# Patient Record
Sex: FEMALE | Race: WHITE
[De-identification: names, ages, dates, MRNs, and addresses within clinical notes are randomized per-mention and may not be internally consistent; named-entity substitution may affect disease eponyms.]

---

## 2018-02-27 ENCOUNTER — HOSPITAL ENCOUNTER (EMERGENCY)
Dept: HOSPITAL 17 - NEPK | Age: 53
Discharge: HOME | End: 2018-02-27
Payer: COMMERCIAL

## 2018-02-27 VITALS
SYSTOLIC BLOOD PRESSURE: 182 MMHG | RESPIRATION RATE: 20 BRPM | HEART RATE: 104 BPM | DIASTOLIC BLOOD PRESSURE: 110 MMHG | TEMPERATURE: 98.3 F

## 2018-02-27 DIAGNOSIS — F43.10: ICD-10-CM

## 2018-02-27 DIAGNOSIS — J44.9: ICD-10-CM

## 2018-02-27 DIAGNOSIS — M25.462: Primary | ICD-10-CM

## 2018-02-27 DIAGNOSIS — I10: ICD-10-CM

## 2018-02-27 DIAGNOSIS — E11.9: ICD-10-CM

## 2018-02-27 DIAGNOSIS — Z87.19: ICD-10-CM

## 2018-02-27 DIAGNOSIS — J45.909: ICD-10-CM

## 2018-02-27 DIAGNOSIS — E11.40: ICD-10-CM

## 2018-02-27 DIAGNOSIS — M19.90: ICD-10-CM

## 2018-02-27 PROCEDURE — 73564 X-RAY EXAM KNEE 4 OR MORE: CPT

## 2018-02-27 PROCEDURE — 99283 EMERGENCY DEPT VISIT LOW MDM: CPT

## 2018-02-27 PROCEDURE — 96372 THER/PROPH/DIAG INJ SC/IM: CPT

## 2018-02-27 NOTE — PD
HPI


Chief Complaint:  Pain: Acute or Chronic


Time Seen by Provider:  16:11


Travel History


International Travel<30 days:  No


Contact w/Intl Traveler<30days:  No


Traveled to known affect area:  No





History of Present Illness


HPI


52-year-old female presents to the emergency room for evaluation of severe left 

knee pain for the past 2 days.  Patient states she injured her knee about 2 

weeks ago after falling down a flight of stairs.  States the pain was minimal 

at that time.  She has had no new injury since then.  States over the past 2 

days pain is severe especially around the medial aspect.  She has history of 

ACL tear and avulsion fracture in 2016.  She was never able to have it 

repaired.  States she has had intermittent, mild pain since then but nothing as 

severe as yesterday and today.  Patient has history of chronic diabetic 

neuropathy and states paresthesias are no worse than normal.  Reports allergy 

to high doses of Tylenol.  States she cannot take anti-inflammatories because 

it hurts her stomach.





PFSH


Past Medical History


Arthritis:  Yes


Asthma:  Yes


Depression:  Yes (PTSD)


Cardiovascular Problems:  Yes


High Cholesterol:  Yes


COPD:  Yes


Diabetes:  Yes


Diminished Hearing:  Yes


Diverticulitis:  Yes


GERD:  Yes


Headaches:  Yes


Hepatitis:  Yes (HEP C)


Hiatal Hernia:  Yes


Hypertension:  Yes


Respiratory:  Yes


Immunizations Current:  Yes


Migraines:  Yes


Pneumonia:  Yes


Triglycerides - High:  Yes


Ulcer:  Yes


Menopausal:  Yes


Tubal Ligation:  Yes





Past Surgical History


 Section:  Yes (X 5)


Hysterectomy:  Yes


Tonsillectomy:  Yes


Other Surgery:  Yes (THROAT BIOPSY)





Social History


Alcohol Use:  Yes (social)


Tobacco Use:  Yes (1 ppd)


Substance Use:  No





Allergies-Medications


(Allergen,Severity, Reaction):  


Coded Allergies:  


     meloxicam (Verified  Allergy, Severe, 17)


 ALLERGY


 


     penicillin G (Verified  Allergy, Severe, 17)


 ALLERGY


 


     prochlorperazine (Verified  Allergy, Severe, 17)


 ALLERGY


 


     tramadol (Verified  Allergy, Severe, 17)


 ALLERGY


     acetaminophen (Verified  Adverse Reaction, Severe, 17)


 GI


Reported Meds & Prescriptions





Reported Meds & Active Scripts


Active


Premarin Vaginal (Estrogens, Conjugated Vaginal) 0.625 Mg/Gm Cream 1 Gm VAGINAL 

HS


Accu-Chek Fastclix Lancet 1 Mis Mis 1 Ea .ROUTE TIDAC


Novolog Inj (Insulin Aspart) 1,000 Unit/10 Ml Vial 2-12 Units SQ TIDAC


Lantus Inj (Insulin Glargine) 100 Unit/Ml Inj 49 Units SQ BID


Ditropan (Oxybutynin Chloride) 5 Mg Tab 5 Mg PO Q8HR


Pantoprazole (Pantoprazole Sodium) 40 Mg Tab 40 Mg PO DAILY


Zocor (Simvastatin) 20 Mg Tab 20 Mg PO DAILY


Advair Diskus Inh (Fluticasone-Salmeterol Inh) 500-50 Mcg/Blist Aer 1 Puff INH 

BID


     Rinse mouth after use.


Ramipril 10 Mg Cap 10 Mg PO DAILY


Duloxetine DR (Duloxetine HCl) 20 Mg Capdr 20 Mg PO DAILY


Byetta Inj (Exenatide) 10 Mcg/0.04 Ml Pfpen 10 Mcg SQ BID


     Use within 60 minutes before the two main


     meals of the day.


Duoneb (Ipratropium-Albuterol Neb) 0.5-2.5 Mg/3 Ml Neb 1 Nebule INH Q4HR NEB


Gabapentin 600 Mg Tab 600 Mg PO HS


Ondansetron Odt 8 Mg Tab 8 Mg SL Q8H PRN


[syringes]     6X DAILY


Linzess (Linaclotide) 290 Mcg Cap 290 Mcg PO DAILY


[needles]     TIDAC


Butalbital-Acetaminophen-Caffeine -40 Mg Cap 1 Cap PO Q4H PRN


     Do not exceed 6 capsules/day.


[walker]   Units  


[nasal cannula]     CONTINUOUS


[oxygen concentrator]     CONTINUOUS








Review of Systems


Except as stated in HPI:  all other systems reviewed are Neg





Physical Exam


Narrative


GENERAL: Well-nourished, well-developed female no acute distress.  Afebrile.  

Ambulatory.


SKIN: Focused skin assessment warm/dry.


HEAD: Normocephalic.


EYES: No scleral icterus. No injection or drainage. 


NECK: Supple, trachea midline. No JVD or lymphadenopathy.


CARDIOVASCULAR: Regular rate and rhythm without murmurs, gallops, or rubs. 


RESPIRATORY: Breath sounds equal bilaterally. No accessory muscle use.


MUSCULOSKELETAL: No cyanosis.  No edema noted.  Mild increased warmth of the 

left knee.  No erythema or ecchymosis.  Tenderness to palpation especially over 

the medial aspect.  No obvious deformity.  Full range of motion.





Data


Data


Last Documented VS





Vital Signs








  Date Time  Temp Pulse Resp B/P (MAP) Pulse Ox O2 Delivery O2 Flow Rate FiO2


 


18 16:01 98.3 104 20 182/110 (134)    








Orders





 Orders


Knee, Complete (4vws) (18 )


Ketorolac Inj (Toradol Inj) (18 17:15)


Cyclobenzaprine (Flexeril) (18 17:15)








[facility]


Medical Decision Making


Medical Screen Exam Complete:  Yes


Emergency Medical Condition:  Yes


Medical Record Reviewed:  Yes


Differential Diagnosis


Strain, sprain, effusion, contusion, fracture


Narrative Course


52-year-old female with history of left ACL tear 2 years ago presents to the 

emergency room for evaluation of left knee pain.  Pain started 2 weeks ago 

after falling down flight of stairs.  Worsened over the past 2 days.  No 

fevers.  Physical exam is reassuring. No edema noted.  Mild increased warmth of 

the left knee.  No erythema or ecchymosis.  Tenderness to palpation especially 

over the medial aspect.  No obvious deformity.  Full range of motion.  X-ray 

shows small effusion without bony deformity.  Patient likely has traumatic 

effusion.  Could be inflammatory arthritis.  No evidence of septic arthritis.  

She has allergies or intolerance to NSAIDs, Tylenol, tramadol, and cannot take 

steroids because of diabetes.  Patient was given Toradol and Flexeril in the 

emergency room.  Discharged with prescription for Flexeril.  Told to follow-up 

with a pain management physician or primary care physician for stronger 

medication.  Told to return for worsening symptoms.





Diagnosis





 Primary Impression:  


 Effusion, left knee


Referrals:  


Primary Care Physician


Patient Instructions:  General Instructions, Swollen Knee Joint (ED)





***Additional Instructions:  


Rest and drink plenty of fluids.


Take Flexeril as directed, as needed for pain.


Apply ice to the affected area for 20 minutes at a time, as needed for pain and 

swelling.


Follow-up with a primary care physician.


Return to the emergency room for worsening symptoms.


Disposition:  01 DISCHARGE HOME


Condition:  Stable











Becca Zavala 2018 17:20

## 2018-02-27 NOTE — RADRPT
EXAM DATE/TIME:  02/27/2018 16:26 

 

[facility] COMPARISON:     

No previous studies available for comparison.

 

                     

INDICATIONS :     

Left knee pain and unable to bear weight, post falling down stairs.

                     

 

MEDICAL HISTORY :            

hx of partial acl tear    

 

SURGICAL HISTORY :     

None.   

 

ENCOUNTER:     

Initial                                        

 

ACUITY:     

2 weeks      

 

PAIN SCORE:     

10/10

 

LOCATION:     

Left  knee

 

FINDINGS:     

Four views of the left knee demonstrate no fracture or dislocation. A small joint effusion is present
. There is no significant arthropathy and mineralization is within normal limits. No soft tissue abno
rmality or radiopaque foreign body is identified.

 

CONCLUSION:     

Small joint effusion. No fracture is identified.

 

 

 

 Herrera Francis MD on February 27, 2018 at 16:40           

Board Certified Radiologist.

 This report was verified electronically.

## 2018-03-20 ENCOUNTER — HOSPITAL ENCOUNTER (EMERGENCY)
Dept: HOSPITAL 17 - NED | Age: 53
Discharge: LEFT BEFORE DISCHARGE | End: 2018-03-20
Payer: COMMERCIAL

## 2018-03-20 VITALS
RESPIRATION RATE: 18 BRPM | DIASTOLIC BLOOD PRESSURE: 83 MMHG | OXYGEN SATURATION: 97 % | SYSTOLIC BLOOD PRESSURE: 167 MMHG | HEART RATE: 92 BPM | TEMPERATURE: 98.7 F

## 2018-03-20 DIAGNOSIS — Z79.4: ICD-10-CM

## 2018-03-20 DIAGNOSIS — H57.12: Primary | ICD-10-CM

## 2018-03-20 DIAGNOSIS — E11.9: ICD-10-CM

## 2018-03-20 DIAGNOSIS — H53.8: ICD-10-CM

## 2018-03-20 PROCEDURE — 99281 EMR DPT VST MAYX REQ PHY/QHP: CPT

## 2018-03-20 NOTE — PD
HPI


Chief Complaint:  Eye Problems/Injury


Time Seen by Provider:  16:32


Travel History


International Travel<30 days:  No


Contact w/Intl Traveler<30days:  No


Traveled to known affect area:  No





History of Present Illness


HPI


53-year-old female presents emergency department for evaluation of left eye 

pain and blurred vision.  Patient states this started Saturday with a pressure.

  She went to her primary care provider today who advised she come to the 

emergency department to check her ocular pressure.  Patient states the blurry 

vision has reduced however it persists.  She still has eye irritation.  Pain 

has reduced as well.  She has no other symptoms to report.





PFSH


Past Medical History


Arthritis:  Yes


Asthma:  Yes


Depression:  Yes (PTSD)


Cardiovascular Problems:  Yes


High Cholesterol:  Yes


COPD:  Yes


Diabetes:  Yes


Diminished Hearing:  Yes


Diverticulitis:  Yes


GERD:  Yes


Headaches:  Yes


Hepatitis:  Yes (HEP C)


Hiatal Hernia:  Yes


Hypertension:  Yes


Respiratory:  Yes


Immunizations Current:  Yes


Migraines:  Yes


Pneumonia:  Yes


Triglycerides - High:  Yes


Ulcer:  Yes


Menopausal:  Yes


Tubal Ligation:  Yes





Past Surgical History


 Section:  Yes (X 5)


Hysterectomy:  Yes


Tonsillectomy:  Yes


Other Surgery:  Yes (THROAT BIOPSY)





Social History


Alcohol Use:  Yes (social)


Tobacco Use:  Yes (1 ppd)


Substance Use:  No





Allergies-Medications


(Allergen,Severity, Reaction):  


Coded Allergies:  


     meloxicam (Verified  Allergy, Severe, 17)


 ALLERGY


 


     penicillin G (Verified  Allergy, Severe, 17)


 ALLERGY


 


     prochlorperazine (Verified  Allergy, Severe, 17)


 ALLERGY


 


     tramadol (Verified  Allergy, Severe, 17)


 ALLERGY


     acetaminophen (Verified  Adverse Reaction, Severe, 17)


 GI


Reported Meds & Prescriptions





Reported Meds & Active Scripts


Active


Flexeril (Cyclobenzaprine HCl) 10 Mg Tab 10 Mg PO BID


Premarin Vaginal (Estrogens, Conjugated Vaginal) 0.625 Mg/Gm Cream 1 Gm VAGINAL 

HS


Accu-Chek Fastclix Lancet 1 Mis Mis 1 Ea .ROUTE TIDAC


Novolog Inj (Insulin Aspart) 1,000 Unit/10 Ml Vial 2-12 Units SQ TIDAC


Lantus Inj (Insulin Glargine) 100 Unit/Ml Inj 49 Units SQ BID


Ditropan (Oxybutynin Chloride) 5 Mg Tab 5 Mg PO Q8HR


Pantoprazole (Pantoprazole Sodium) 40 Mg Tab 40 Mg PO DAILY


Zocor (Simvastatin) 20 Mg Tab 20 Mg PO DAILY


Advair Diskus Inh (Fluticasone-Salmeterol Inh) 500-50 Mcg/Blist Aer 1 Puff INH 

BID


     Rinse mouth after use.


Ramipril 10 Mg Cap 10 Mg PO DAILY


Duloxetine DR (Duloxetine HCl) 20 Mg Capdr 20 Mg PO DAILY


Byetta Inj (Exenatide) 10 Mcg/0.04 Ml Pfpen 10 Mcg SQ BID


     Use within 60 minutes before the two main


     meals of the day.


Duoneb (Ipratropium-Albuterol Neb) 0.5-2.5 Mg/3 Ml Neb 1 Nebule INH Q4HR NEB


Gabapentin 600 Mg Tab 600 Mg PO HS


Ondansetron Odt 8 Mg Tab 8 Mg SL Q8H PRN


[syringes]     6X DAILY


Linzess (Linaclotide) 290 Mcg Cap 290 Mcg PO DAILY


[needles]     TIDAC


Butalbital-Acetaminophen-Caffeine -40 Mg Cap 1 Cap PO Q4H PRN


     Do not exceed 6 capsules/day.


[walker]   Units  


[nasal cannula]     CONTINUOUS


[oxygen concentrator]     CONTINUOUS








Review of Systems


Except as stated in HPI:  all other systems reviewed are Neg





Physical Exam


Narrative


Well-nourished agitated female patient, in no acute distress.  She has a non-

ataxic gait.  Her pupils are equal and reactive.  She does have scleral 

injection the lateral left eye.  No facial erythema or edema.  EOMI.  Patient 

moves all extremities.  Even respirations.  Normal heart rate.  She speaks 

clearly.





Data


Data


Last Documented VS





Vital Signs








  Date Time  Temp Pulse Resp B/P (MAP) Pulse Ox O2 Delivery O2 Flow Rate FiO2


 


3/20/18 16:41 98.7 92 18 167/83 (140) 97   











MDM


Medical Decision Making


Medical Screen Exam Complete:  Yes


Emergency Medical Condition:  Yes


Medical Record Reviewed:  Yes


Differential Diagnosis


Acute angle glaucoma versus conjunctivitis versus keratitis versus allergies 

versus corneal abrasion


Narrative Course


53-year-old female presents to emergency department for evaluation of left eye 

irritation, pain, blurred vision.  Prior to bed placement, patient chooses to 

leave despite reassurance that we were going to check her pressures as soon as 

possible in the fast track area move her to the appropriate area.  It was 

simply waiting for an area to open up.  She did not like this answer.  AMA: The 

risks of leaving against medical advice without further evaluation treatment 

were discussed with the patient.  These risks include cardiac dysfunction, 

cardiac dysrhythmia, possible heart attack, possible stroke or death.  The 

patient indicated understanding of these risks and appeared to have the 

capacity to make this decision.





Diagnosis





 Primary Impression:  


 Left eye pain


Disposition:  07 AGAINST MEDICAL ADVICE


Condition:  Stable











Faiza Fuller SIVAN Mar 20, 2018 17:39

## 2018-06-26 ENCOUNTER — HOSPITAL ENCOUNTER (OUTPATIENT)
Dept: HOSPITAL 17 - NEPC | Age: 53
Setting detail: OBSERVATION
LOS: 1 days | Discharge: HOME | End: 2018-06-27
Attending: FAMILY MEDICINE | Admitting: FAMILY MEDICINE
Payer: MEDICAID

## 2018-06-26 VITALS
SYSTOLIC BLOOD PRESSURE: 140 MMHG | OXYGEN SATURATION: 98 % | TEMPERATURE: 98.4 F | RESPIRATION RATE: 16 BRPM | HEART RATE: 95 BPM | DIASTOLIC BLOOD PRESSURE: 81 MMHG

## 2018-06-26 VITALS
DIASTOLIC BLOOD PRESSURE: 98 MMHG | HEART RATE: 122 BPM | OXYGEN SATURATION: 94 % | RESPIRATION RATE: 17 BRPM | SYSTOLIC BLOOD PRESSURE: 154 MMHG | TEMPERATURE: 98.4 F

## 2018-06-26 VITALS — HEART RATE: 91 BPM

## 2018-06-26 VITALS
OXYGEN SATURATION: 97 % | DIASTOLIC BLOOD PRESSURE: 90 MMHG | SYSTOLIC BLOOD PRESSURE: 158 MMHG | HEART RATE: 105 BPM | RESPIRATION RATE: 16 BRPM

## 2018-06-26 VITALS — WEIGHT: 160.94 LBS | BODY MASS INDEX: 30.39 KG/M2 | HEIGHT: 61 IN

## 2018-06-26 VITALS
DIASTOLIC BLOOD PRESSURE: 78 MMHG | HEART RATE: 101 BPM | SYSTOLIC BLOOD PRESSURE: 139 MMHG | RESPIRATION RATE: 18 BRPM | OXYGEN SATURATION: 96 %

## 2018-06-26 DIAGNOSIS — K57.92: ICD-10-CM

## 2018-06-26 DIAGNOSIS — R94.31: ICD-10-CM

## 2018-06-26 DIAGNOSIS — R07.9: Primary | ICD-10-CM

## 2018-06-26 DIAGNOSIS — K21.9: ICD-10-CM

## 2018-06-26 DIAGNOSIS — Z79.899: ICD-10-CM

## 2018-06-26 DIAGNOSIS — E11.65: ICD-10-CM

## 2018-06-26 DIAGNOSIS — E78.5: ICD-10-CM

## 2018-06-26 DIAGNOSIS — B19.20: ICD-10-CM

## 2018-06-26 DIAGNOSIS — F32.9: ICD-10-CM

## 2018-06-26 DIAGNOSIS — K44.9: ICD-10-CM

## 2018-06-26 DIAGNOSIS — Z79.4: ICD-10-CM

## 2018-06-26 DIAGNOSIS — R51: ICD-10-CM

## 2018-06-26 DIAGNOSIS — F17.210: ICD-10-CM

## 2018-06-26 DIAGNOSIS — F43.10: ICD-10-CM

## 2018-06-26 DIAGNOSIS — E78.00: ICD-10-CM

## 2018-06-26 DIAGNOSIS — R06.02: ICD-10-CM

## 2018-06-26 DIAGNOSIS — Z90.710: ICD-10-CM

## 2018-06-26 DIAGNOSIS — R00.0: ICD-10-CM

## 2018-06-26 DIAGNOSIS — M19.90: ICD-10-CM

## 2018-06-26 DIAGNOSIS — I10: ICD-10-CM

## 2018-06-26 DIAGNOSIS — M54.5: ICD-10-CM

## 2018-06-26 DIAGNOSIS — J44.9: ICD-10-CM

## 2018-06-26 LAB
BASOPHILS # BLD AUTO: 0.1 TH/MM3 (ref 0–0.2)
BASOPHILS NFR BLD: 0.7 % (ref 0–2)
BUN SERPL-MCNC: 10 MG/DL (ref 7–18)
CALCIUM SERPL-MCNC: 9.5 MG/DL (ref 8.5–10.1)
CHLORIDE SERPL-SCNC: 104 MEQ/L (ref 98–107)
CREAT SERPL-MCNC: 0.75 MG/DL (ref 0.5–1)
EOSINOPHIL # BLD: 0.2 TH/MM3 (ref 0–0.4)
EOSINOPHIL NFR BLD: 1.2 % (ref 0–4)
ERYTHROCYTE [DISTWIDTH] IN BLOOD BY AUTOMATED COUNT: 13.1 % (ref 11.6–17.2)
GFR SERPLBLD BASED ON 1.73 SQ M-ARVRAT: 81 ML/MIN (ref 89–?)
GLUCOSE SERPL-MCNC: 270 MG/DL (ref 74–106)
HCO3 BLD-SCNC: 20.9 MEQ/L (ref 21–32)
HCT VFR BLD CALC: 47.5 % (ref 35–46)
HGB BLD-MCNC: 15.8 GM/DL (ref 11.6–15.3)
INR PPP: 1 RATIO
LYMPHOCYTES # BLD AUTO: 4.6 TH/MM3 (ref 1–4.8)
LYMPHOCYTES NFR BLD AUTO: 32.7 % (ref 9–44)
MCH RBC QN AUTO: 29.6 PG (ref 27–34)
MCHC RBC AUTO-ENTMCNC: 33.4 % (ref 32–36)
MCV RBC AUTO: 88.7 FL (ref 80–100)
MONOCYTE #: 0.5 TH/MM3 (ref 0–0.9)
MONOCYTES NFR BLD: 3.6 % (ref 0–8)
NEUTROPHILS # BLD AUTO: 8.8 TH/MM3 (ref 1.8–7.7)
NEUTROPHILS NFR BLD AUTO: 61.8 % (ref 16–70)
PLATELET # BLD: 265 TH/MM3 (ref 150–450)
PMV BLD AUTO: 9.4 FL (ref 7–11)
PROTHROMBIN TIME: 10.3 SEC (ref 9.8–11.6)
RBC # BLD AUTO: 5.35 MIL/MM3 (ref 4–5.3)
SODIUM SERPL-SCNC: 136 MEQ/L (ref 136–145)
TROPONIN I SERPL-MCNC: 0.02 NG/ML (ref 0.02–0.05)
TROPONIN I SERPL-MCNC: 0.02 NG/ML (ref 0.02–0.05)
WBC # BLD AUTO: 14.2 TH/MM3 (ref 4–11)

## 2018-06-26 PROCEDURE — 83690 ASSAY OF LIPASE: CPT

## 2018-06-26 PROCEDURE — 83036 HEMOGLOBIN GLYCOSYLATED A1C: CPT

## 2018-06-26 PROCEDURE — 93017 CV STRESS TEST TRACING ONLY: CPT

## 2018-06-26 PROCEDURE — 82550 ASSAY OF CK (CPK): CPT

## 2018-06-26 PROCEDURE — 71275 CT ANGIOGRAPHY CHEST: CPT

## 2018-06-26 PROCEDURE — 93005 ELECTROCARDIOGRAM TRACING: CPT

## 2018-06-26 PROCEDURE — 85025 COMPLETE CBC W/AUTO DIFF WBC: CPT

## 2018-06-26 PROCEDURE — 85610 PROTHROMBIN TIME: CPT

## 2018-06-26 PROCEDURE — 71045 X-RAY EXAM CHEST 1 VIEW: CPT

## 2018-06-26 PROCEDURE — 84484 ASSAY OF TROPONIN QUANT: CPT

## 2018-06-26 PROCEDURE — 80048 BASIC METABOLIC PNL TOTAL CA: CPT

## 2018-06-26 PROCEDURE — 85730 THROMBOPLASTIN TIME PARTIAL: CPT

## 2018-06-26 PROCEDURE — 84703 CHORIONIC GONADOTROPIN ASSAY: CPT

## 2018-06-26 PROCEDURE — A9502 TC99M TETROFOSMIN: HCPCS

## 2018-06-26 PROCEDURE — G0378 HOSPITAL OBSERVATION PER HR: HCPCS

## 2018-06-26 PROCEDURE — 99285 EMERGENCY DEPT VISIT HI MDM: CPT

## 2018-06-26 PROCEDURE — 82948 REAGENT STRIP/BLOOD GLUCOSE: CPT

## 2018-06-26 PROCEDURE — 94664 DEMO&/EVAL PT USE INHALER: CPT

## 2018-06-26 PROCEDURE — 78452 HT MUSCLE IMAGE SPECT MULT: CPT

## 2018-06-26 RX ADMIN — INSULIN ASPART SCH: 100 INJECTION, SOLUTION INTRAVENOUS; SUBCUTANEOUS at 22:36

## 2018-06-26 RX ADMIN — NICOTINE SCH PATCH: 21 PATCH, EXTENDED RELEASE TOPICAL at 22:37

## 2018-06-26 RX ADMIN — Medication SCH ML: at 22:36

## 2018-06-26 RX ADMIN — OXYBUTYNIN CHLORIDE SCH MG: 5 TABLET ORAL at 22:37

## 2018-06-26 RX ADMIN — ACYCLOVIR SCH UNITS: 800 TABLET ORAL at 22:36

## 2018-06-26 RX ADMIN — BUDESONIDE AND FORMOTEROL FUMARATE DIHYDRATE SCH PUFF: 160; 4.5 AEROSOL RESPIRATORY (INHALATION) at 22:36

## 2018-06-26 NOTE — PD
HPI


Chief Complaint:  Chest Pain


Time Seen by Provider:  16:49


Travel History


International Travel<30 days:  No


Contact w/Intl Traveler<30days:  No


Traveled to known affect area:  No





History of Present Illness


HPI


53-year-old female that presents to the ED for evaluation of chest pain on and 

off for the past couple months.  Per patient she has a history of hypertension, 

diabetes, chronic pain as well as chronic abdominal pain.  Patient states that 

she has been having chest pain on and off for a couple of days.  Apparently she 

went to follow-up today with her doctors were or the residents here in [place].

  Apparently they were concerned because he did an EKG and showed tachycardia 

with some changes on her EKG that she did not have before.  She was sent here 

for evaluation because she was also complaining of the chest pain at the time.  

She states that currently she has no chest pain.  Per patient she used to take 

nitroglycerin for this with some relief but she has not had any for about 3 

months now.  Per patient she has been under control with her diabetes and high 

blood pressure.  She states that she does have some shortness of breath with 

location with the chest pain.  Allergies to different medications.  Denies any 

numbness, drooling, weakness.  No abdominal pain.  No nausea or vomiting.  No 

diarrhea.  No bowel movement issues.  She states that she was given 

nitroglycerin on the way here with improvement of her discomfort.  No other 

medical issues.  She does not take any blood thinners.  Per patient when the 

pain comes is pressure-like and feels like a 5 out of 10.  Per patient she is 

never thought much of it.  She has not had a stress test in about 3 years per 

patient.  She currently has no cardiologist.





PFSH


Past Medical History


Arthritis:  Yes


Asthma:  Yes


Depression:  Yes (PTSD)


Cardiovascular Problems:  Yes


High Cholesterol:  Yes


COPD:  Yes


Diabetes:  Yes


Patient Takes Glucophage:  No


Diminished Hearing:  Yes


Diverticulitis:  Yes


GERD:  Yes


Headaches:  Yes


Hepatitis:  Yes (HEP C)


Hiatal Hernia:  Yes


Hypertension:  Yes


Respiratory:  Yes


Immunizations Current:  Yes


Migraines:  Yes


Pneumonia:  Yes


Triglycerides - High:  Yes


Ulcer:  Yes


Tetanus Vaccination:  < 5 Years


Pregnant?:  Not Pregnant


Menopausal:  Yes


Tubal Ligation:  Yes





Past Surgical History


 Section:  Yes (X 5)


Hysterectomy:  Yes


Tonsillectomy:  Yes


Other Surgery:  Yes (THROAT BIOPSY)





Social History


Alcohol Use:  Yes (social)


Tobacco Use:  Yes (1 ppd)


Substance Use:  No





Allergies-Medications


(Allergen,Severity, Reaction):  


Coded Allergies:  


     meloxicam (Verified  Allergy, Severe, 18)


 ALLERGY


 


     penicillin G (Verified  Allergy, Severe, 18)


 ALLERGY


 


     prochlorperazine (Verified  Allergy, Severe, 18)


 ALLERGY


 


     tramadol (Verified  Allergy, Severe, 18)


 ALLERGY


     acetaminophen (Verified  Adverse Reaction, Severe, 18)


 GI


Reported Meds & Prescriptions





Reported Meds & Active Scripts


Active


Premarin Vaginal (Estrogens, Conjugated Vaginal) 0.625 Mg/Gm Cream 1 Gm VAGINAL 

HS


Novolog Inj (Insulin Aspart) 1,000 Unit/10 Ml Vial 2-12 Units SQ TIDAC


Lantus Inj (Insulin Glargine) 100 Unit/Ml Inj 49 Units SQ BID


Ditropan (Oxybutynin Chloride) 5 Mg Tab 5 Mg PO Q8HR


Pantoprazole (Pantoprazole Sodium) 40 Mg Tab 40 Mg PO DAILY


Zocor (Simvastatin) 20 Mg Tab 20 Mg PO DAILY


Advair Diskus Inh (Fluticasone-Salmeterol Inh) 500-50 Mcg/Blist Aer 1 Puff INH 

BID


     Rinse mouth after use.


Ramipril 10 Mg Cap 10 Mg PO DAILY


Duloxetine DR (Duloxetine HCl) 20 Mg Capdr 20 Mg PO DAILY


Byetta Inj (Exenatide) 10 Mcg/0.04 Ml Pfpen 10 Mcg SQ BID


     Use within 60 minutes before the two main


     meals of the day.


Duoneb (Ipratropium-Albuterol Neb) 0.5-2.5 Mg/3 Ml Neb 1 Nebule INH Q4HR NEB


Gabapentin 600 Mg Tab 600 Mg PO HS


Ondansetron Odt 8 Mg Tab 8 Mg SL Q8H PRN


Butalbital-Acetaminophen-Caffeine -40 Mg Cap 1 Cap PO Q4H PRN


     Do not exceed 6 capsules/day.


Reported


Miralax Powder (Polyethylene Glycol 3350 Powder) 17 Gm Powd 17 Gm PO DAILY


     Mix and dissolve one measuring cap-ful (17 grams) in water or juice.








Review of Systems


Except as stated in HPI:  all other systems reviewed are Neg





Physical Exam


Narrative


GENERAL: 


SKIN: Warm and dry.


HEAD: Atraumatic. Normocephalic. 


EYES: Pupils equal and round. No scleral icterus. No injection or drainage. 


ENT: No nasal bleeding or discharge.  Mucous membranes pink and moist.  Tongue 

is midline.  No uvula deviation.


NECK: Trachea midline. No JVD. 


CARDIOVASCULAR:  tachycardia rate and rhythm.  No murmurs, S3, S4.


RESPIRATORY: No accessory muscle use. Clear to auscultation. Breath sounds 

equal bilaterally. 


GASTROINTESTINAL: Abdomen soft, non-tender, nondistended. Hepatic and splenic 

margins not palpable. 


MUSCULOSKELETAL: Extremities without clubbing, cyanosis, or edema. No obvious 

deformities.  Full range of motion of the upper and lower extremities 

bilaterally.  2+ pulses bilaterally.  


NEUROLOGICAL: Awake and alert. No obvious cranial nerve deficits.  Motor 

grossly within normal limits. Five out of 5 muscle strength in the arms and 

legs.  Normal speech.


PSYCHIATRIC: Appropriate mood and affect; insight and judgment normal.





Data


Data


Last Documented VS





Vital Signs








  Date Time  Temp Pulse Resp B/P (MAP) Pulse Ox O2 Delivery O2 Flow Rate FiO2


 


18 18:01  101 18 139/78 (98) 96 Room Air  


 


18 15:45 98.4       








Orders





 Orders


Electrocardiogram (18 15:48)


Complete Blood Count With Diff (18 15:48)


Basic Metabolic Panel (Bmp) (18 15:48)


Ckmb (Isoenzyme) Profile (18 15:48)


Troponin I (18 15:48)


Iv Access Insert/Monitor (18 15:48)


Ecg Monitoring (18 15:48)


Oxygen Administration (18 15:48)


Oximetry (18 15:48)


Chest, Single Ap (18 15:48)


Coag Profile (18 16:52)


Lipase (18 16:52)


Ct Pulmonary Angiogram (18 )


Aspirin (Aspirin) (18 17:15)


Iohexol 350 Inj (Omnipaque 350 Inj) (18 15:31)


Admit Order (Ed Use Only) (18 18:57)





Labs





Laboratory Tests








Test


  18


16:00 18


17:05


 


White Blood Count 14.2 TH/MM3  


 


Red Blood Count 5.35 MIL/MM3  


 


Hemoglobin 15.8 GM/DL  


 


Hematocrit 47.5 %  


 


Mean Corpuscular Volume 88.7 FL  


 


Mean Corpuscular Hemoglobin 29.6 PG  


 


Mean Corpuscular Hemoglobin


Concent 33.4 % 


  


 


 


Red Cell Distribution Width 13.1 %  


 


Platelet Count 265 TH/MM3  


 


Mean Platelet Volume 9.4 FL  


 


Neutrophils (%) (Auto) 61.8 %  


 


Lymphocytes (%) (Auto) 32.7 %  


 


Monocytes (%) (Auto) 3.6 %  


 


Eosinophils (%) (Auto) 1.2 %  


 


Basophils (%) (Auto) 0.7 %  


 


Neutrophils # (Auto) 8.8 TH/MM3  


 


Lymphocytes # (Auto) 4.6 TH/MM3  


 


Monocytes # (Auto) 0.5 TH/MM3  


 


Eosinophils # (Auto) 0.2 TH/MM3  


 


Basophils # (Auto) 0.1 TH/MM3  


 


CBC Comment DIFF FINAL  


 


Differential Comment   


 


Blood Urea Nitrogen 10 MG/DL  


 


Creatinine 0.75 MG/DL  


 


Random Glucose 270 MG/DL  


 


Calcium Level 9.5 MG/DL  


 


Sodium Level 136 MEQ/L  


 


Potassium Level 3.8 MEQ/L  


 


Chloride Level 104 MEQ/L  


 


Carbon Dioxide Level 20.9 MEQ/L  


 


Anion Gap 11 MEQ/L  


 


Estimat Glomerular Filtration


Rate 81 ML/MIN 


  


 


 


Total Creatine Kinase 75 U/L  


 


Troponin I 0.02 NG/ML  


 


Prothrombin Time  10.3 SEC 


 


Prothromb Time International


Ratio 


  1.0 RATIO 


 


 


Activated Partial


Thromboplast Time 


  27.7 SEC 


 


 


Lipase  47 U/L 











[facility]


Medical Decision Making


Medical Screen Exam Complete:  Yes


Emergency Medical Condition:  Yes


Medical Record Reviewed:  Yes


Interpretation(s)


CBC & BMP Diagram


18 16:00








Calcium Level 9.5








Last Impressions








Chest X-Ray 18 1548 Signed





Impressions: 





 CONCLUSION: 





 No acute cardiopulmonary abnormality is identified.





  





 





troponin and CKMB negative


Troponin and CK-MB negative.


Differential Diagnosis


Chest pain versus typical chest pain versus ACS versus PE versus tachycardia 

versus arrhythmia


Narrative Course


53-year-old female that presents to the ED for evaluation of chest pain.  

Patient was properly examined and was found to have signs and symptoms 

consistent appears to be chest pain.  Unclear etiology.  With deafly concerning 

for ACS.  She does have multiple risk factors for this.  Also concern for PE.  

My attending Dr. Buckley evaluated patient recommend CTA as well as blood work and 

imaging.  Patient was given aspirin.  Labs and imaging were essentially 

unremarkable here.  Unclear etiology of the pain but deafly concerning for ACS 

as stated above.  She does have older multiple medical issues which 

unfortunately do not make her a good candidate for chest pain center admission.

  At this time a recommend admission to the residence.  My attending Dr. Buckley 

agrees with this.  Case discussed with the residents who agreed to admission.





Diagnosis





 Primary Impression:  


 Chest pain in adult





Admitting Information


Admitting Physician Requests:  Observation











Laci Olivo 2018 19:06

## 2018-06-26 NOTE — HHI.HP
HPI


Service


Family Medicine


Primary Care Physician


Unknown


Admission Diagnosis





acute chest pain, r/o ACS, tachycardia


Diagnoses:  


Chief Complaint:  


Chest pain


International Travel<30 Days:  No


Contact w/Intl Traveler<30days:  No


Known Affected Area:  No


History of Present Illness


53-year-old female with past medical history of diabetes, COPD, chronic low 

back pain, frequent UTI presenting with substernal chest pressure occurring on 

and off and unrecognizable pattern for the last several years, worsening in 

intensity over the last 2-3 days.  Symptoms are not related to exertion, meals, 

stress as far as the patient is aware.  Nonradiating.  Has baseline shortness 

of breath which has not increased over the last several days.  Constant stable 

reflux, no increased abdominal pain, no nausea, no vomiting.  Had dilated eye 

exam today so sensitive to light, but prior to this no vision changes.


 (Gus Wu MD R2)





Review of Systems


Constitutional:  DENIES: Fever, Chills


Endocrine:  DENIES: Heat/cold intolerance


Eyes:  DENIES: Vision loss


Ears, nose, mouth, throat:  DENIES: Throat pain, Ear Pain, Sinus Pain


Respiratory:  COMPLAINS OF: Shortness of breath, DENIES: Cough, Wheezing, 

Sputum production


Cardiovascular:  COMPLAINS OF: Chest pain, DENIES: Palpitations, Syncope, Lower 

Extremity Edema


Gastrointestinal:  COMPLAINS OF: Abdominal pain (chronic), DENIES: Diarrhea, 

Nausea, Vomiting


Genitourinary:  COMPLAINS OF: Urinary frequency (chronic), DENIES: Abnormal 

vaginal bleeding, Urinary incontinence, Dysuria


Musculoskeletal:  COMPLAINS OF: Back pain (chronic), DENIES: Joint pain, Muscle 

aches


Integumentary:  DENIES: Rash


Hematologic/lymphatic:  DENIES: Bruising


Immunologic/allergic:  DENIES: Urticaria


Neurologic:  DENIES: Abnormal gait


Psychiatric:  DENIES: Anxiety, Depression (Gus Wu MD R2)





Past Family Social History


Past Medical History


DM - with ?neuropathy, ?gastroparesis, on long term insulin


"throat cancer" - states she had biopsy by an ENT doctor in [place] years ago 

which came back benign but she was told it could progress to cancer - Morales's 

esophagus?


   ENT referral placed 6/26 by Dr. COLTON Anaya in clinic


Chronic low back pain - managed with non-opiate therapy


Reported h/o hepatitis C


Frequent UTI - urinary reflux on oxybutynin 


GERD


Past Surgical History


C/S x5


Total hysterectomy


Tonsillectomy


Reported Medications





Reported Meds & Active Scripts


Active


Premarin Vaginal (Estrogens, Conjugated Vaginal) 0.625 Mg/Gm Cream 1 Gm VAGINAL 

HS


Novolog Inj (Insulin Aspart) 1,000 Unit/10 Ml Vial 2-12 Units SQ TIDAC


Lantus Inj (Insulin Glargine) 100 Unit/Ml Inj 40 Units SQ BID


Ditropan (Oxybutynin Chloride) 5 Mg Tab 5 Mg PO Q8HR


Pantoprazole (Pantoprazole Sodium) 40 Mg Tab 40 Mg PO DAILY


Zocor (Simvastatin) 20 Mg Tab 20 Mg PO DAILY


Advair Diskus Inh (Fluticasone-Salmeterol Inh) 500-50 Mcg/Blist Aer 1 Puff INH 

BID


     Rinse mouth after use.


Ramipril 10 Mg Cap 10 Mg PO DAILY


Duloxetine DR (Duloxetine HCl) 20 Mg Capdr 20 Mg PO DAILY


Byetta Inj (Exenatide) 10 Mcg/0.04 Ml Pfpen 10 Mcg SQ BID


     Use within 60 minutes before the two main


     meals of the day.


Duoneb (Ipratropium-Albuterol Neb) 0.5-2.5 Mg/3 Ml Neb 1 Nebule INH Q4HR NEB


Gabapentin 600 Mg Tab 600 Mg PO HS


Ondansetron Odt 8 Mg Tab 8 Mg SL Q8H PRN


Butalbital-Acetaminophen-Caffeine -40 Mg Cap 1 Cap PO Q4H PRN


     Do not exceed 6 capsules/day.


Reported


Miralax Powder (Polyethylene Glycol 3350 Powder) 17 Gm Powd 17 Gm PO DAILY


     Mix and dissolve one measuring cap-ful (17 grams) in water or juice.


 (Gus Wu MD R2)


Allergies:  


Coded Allergies:  


     meloxicam (Verified  Allergy, Severe, 6/26/18)


 ALLERGY


 


     penicillin G (Verified  Allergy, Severe, 6/26/18)


 ALLERGY


 


     prochlorperazine (Verified  Allergy, Severe, 6/26/18)


 ALLERGY


 


     tramadol (Verified  Allergy, Severe, 6/26/18)


 ALLERGY


     acetaminophen (Verified  Adverse Reaction, Severe, 6/26/18)


 GI


Active Ordered Medications





Current Medications








 Medications


  (Trade)  Dose


 Ordered  Sig/Jesenia


 Route  Start Time


 Stop Time Status Last Admin


 


  (Cymbalta Dr)  20 mg  DAILY


 PO  6/27/18 09:00


   UNV  


 


 


  (Neurontin)  600 mg  HS


 PO  6/26/18 21:00


   UNV  


 


 


  (Zofran  Odt)  8 mg  Q8H  PRN


 SL  6/26/18 19:30


   UNV  


 


 


  (Ditropan)  5 mg  Q8HR


 PO  6/26/18 22:00


   UNV  


 


 


  (Protonix)  40 mg  DAILY


 PO  6/27/18 09:00


   UNV  


 


 


  (Miralax)  17 gm  DAILY


 PO  6/27/18 09:00


   UNV  


 


 


 Non-Formulary


 Medication  1 puff  BID


 INH  6/26/18 21:00


   UNV  


 


 


 Non-Formulary


 Medication  10 mg  DAILY


 PO  6/27/18 09:00


   UNV  


 


 


 Non-Formulary


 Medication  20 mg  DAILY


 PO  6/27/18 09:00


   UNV  


 


 


  (NS Flush)  2 ml  UNSCH  PRN


 IV FLUSH  6/26/18 19:45


   UNV  


 


 


  (NS Flush)  2 ml  BID


 IV FLUSH  6/26/18 21:00


   UNV  


 


 


  (Tylenol)  500 mg  Q4H  PRN


 PO  6/26/18 19:45


   UNV  


 


 


  (D50w (Vial) Inj)  50 ml  UNSCH  PRN


 IV PUSH  6/26/18 20:00


   UNV  


 


 


  (Glucagon Inj)  1 mg  UNSCH  PRN


 OTHER  6/26/18 20:00


   UNV  


 


 


  (NovoLOG


 SUPPLEMENTAL


 SCALE)  1  ACHS SLIDING  SCALE


 SQ  6/26/18 21:00


   UNV  


 


 


  (Levemir Inj)  10 units  BID


 SQ  6/26/18 21:00


   UNV  


 


 


  (Habitrol 21 Mg


 Patch.24 Hr)  1 patch  DAILY


 T-DERMAL  6/26/18 20:00


   UNV  


 


 


 Miscellaneous


 Information  1  DAILY


 T-DERMAL  6/27/18 09:00


   UNV  


 








Family History


Mother - DM


Father - Heart disease


Social History


Lives with daughter and 3 yo grandson


Tobacco - Current 1 PPD smoker, smoked 40 years


Alcohol - socially


Drugs - denies use


 (Gus Wu MD R2)





Physical Exam


Vital Signs





Vital Signs








  Date Time  Temp Pulse Resp B/P (MAP) Pulse Ox O2 Delivery O2 Flow Rate FiO2


 


6/26/18 18:01  101 18 139/78 (98) 96 Room Air  


 


6/26/18 16:58     97 Room Air  


 


6/26/18 16:58  116   97 Room Air  


 


6/26/18 16:58  110 16 158/90 (112) 97 Room Air  


 


6/26/18 16:58  105 18 158/90 (112) 97 Room Air  


 


6/26/18 15:45 98.4 122 17 154/98 (768) 94   








Physical Exam


GENERAL: WDWN adult white female appearing 


SKIN: No rashes, ecchymoses or lesions. Cool and dry.


HEAD: NC/AT


EYES: Pupils very dilated (had dilated eye exam today), equal. EOMI. No 

conjunctival injection or drainage. 


ENT: MMM, OP without erythema, tonsillar swelling, or exudate.


NECK: Supple. No JVD.


CARDIOVASCULAR: NRRR. Normal S1/S2. No MRG


RESPIRATORY: Normal rate and effort. End expiratory wheezes at the bilateral 

bases. 


GASTROINTESTINAL: Abdomen soft, non-distended, non-tender. No hepato-

splenomegaly or palpable masses. 


MUSCULOSKELETAL: Extremities without clubbing, cyanosis, or edema.


NEUROLO/PSYCH: Awake and alert. Cranial nerves II through XII grossly intact. 

Moves all extremities without difficulty.  Normal speech. Normal mood. Affect 

slightly anxious/dramatic. No hallucinations or delusions.


Laboratory





Laboratory Tests








Test


  6/26/18


16:00 6/26/18


17:05


 


White Blood Count 14.2  


 


Red Blood Count 5.35  


 


Hemoglobin 15.8  


 


Hematocrit 47.5  


 


Mean Corpuscular Volume 88.7  


 


Mean Corpuscular Hemoglobin 29.6  


 


Mean Corpuscular Hemoglobin


Concent 33.4 


  


 


 


Red Cell Distribution Width 13.1  


 


Platelet Count 265  


 


Mean Platelet Volume 9.4  


 


Neutrophils (%) (Auto) 61.8  


 


Lymphocytes (%) (Auto) 32.7  


 


Monocytes (%) (Auto) 3.6  


 


Eosinophils (%) (Auto) 1.2  


 


Basophils (%) (Auto) 0.7  


 


Neutrophils # (Auto) 8.8  


 


Lymphocytes # (Auto) 4.6  


 


Monocytes # (Auto) 0.5  


 


Eosinophils # (Auto) 0.2  


 


Basophils # (Auto) 0.1  


 


CBC Comment DIFF FINAL  


 


Differential Comment   


 


Blood Urea Nitrogen 10  


 


Creatinine 0.75  


 


Random Glucose 270  


 


Calcium Level 9.5  


 


Sodium Level 136  


 


Potassium Level 3.8  


 


Chloride Level 104  


 


Carbon Dioxide Level 20.9  


 


Anion Gap 11  


 


Estimat Glomerular Filtration


Rate 81 


  


 


 


Total Creatine Kinase 75  


 


Troponin I 0.02  


 


Prothrombin Time  10.3 


 


Prothromb Time International


Ratio 


  1.0 


 


 


Activated Partial


Thromboplast Time 


  27.7 


 


 


Lipase  47 








 (Gus Wu MD R2)


Result Diagram:  


6/26/18 1600                                                                   

             6/26/18 1600





Imaging





Last Impressions








Chest X-Ray 6/26/18 3270 Signed





Impressions: 





 CONCLUSION: 





 No acute cardiopulmonary abnormality is identified.





  





 








 (Gus Wu MD R2)





Caprini VTE Risk Assessment


Capyunier VTE Risk Assessment:  No/Low Risk (score <= 1)


 (Gus Wu MD R2)





Assessment and Plan


Assessment and Plan


52 yo female with COPD, DM, low back pain presenting with:


 (Gus Wu MD R2)


Attending Attestation


THIS CASE WAS DISCUSSED WITH THE RESIDENT PHYSICIAN. I HAVE REVIEWED THE RECORD 

AND AGREE WITH THE ABOVE NOTE AND PLAN OF CARE AS WAS DISCUSSED. I HAVE 

AUTHORIZED THE ORDER FOR PLACEMENT IN OUT-PATIENT OBSERVATION STATUS. 


 (Jose Cruz Dick MD)


Problem List:  


(1) Chest pain in adult


ICD Codes:  R07.9 - Chest pain, unspecified


Status:  Acute


Plan:  Not classically cardiac however patient is female with risk factors


Initial troponin negative


EKG showing sinus tachycardia


Blood glucose 270, no acidosis, low suspicion of DKA





-Trend troponin, ECG


-BMP, A1C in the morning


-NPO after midnight for AM stress test routine (last tested over a year ago per 

patient)


-Aspirin daily


-If CP recurs, give oral NTG, obtain stat ECG


-Tylenol PRN for mild aches/pains or headache (causes some GI upset but is good 

option for her at this time)


-Manage DM as below





(2) COPD (chronic obstructive pulmonary disease)


ICD Codes:  J44.9 - Chronic obstructive pulmonary disease, unspecified


Plan:  Stable, mild wheezing on exam





Continue home Advair


DuoNeb Q4H PRN


O2 if needed 





(3) Diabetes


ICD Codes:  E11.9 - Type 2 diabetes mellitus without complications


Status:  Acute


Plan:  Glucose 270. Patient reports home sugars running in the low- to mid-300s.





-Check A1C


-Repeat BMP in the morning


-Accuchek, medium SSI


-On 40 of lantus BID at home; start with 10 levemir BID in house, titrate as 

needed


-Hold home Byetta





(4) Tobacco abuse


ICD Codes:  Z72.0 - Tobacco use


Status:  Acute


Plan:  Give nicotine patch while hospitalized





(5) FEN/PPX


Plan:  Fluid: PO only


Elecs: Replete PRN


Diet: NPO after midnight; otherwise heart healthy





DVT: Early ambulation


GI: continue home PPI


NV: continue Zofran PRN


Low back pain: Stable, continue home gabapentin and Cymbalta


Urinary reflux: Continue home oxybutynin





Code status: Full code





Dispo: Observation overnight. If stress test negative can likely discharge in 

the morning


 (Gus Wu MD R2)





Problem Qualifiers





(1) COPD (chronic obstructive pulmonary disease):  


Qualified Codes:  J44.9 - Chronic obstructive pulmonary disease, unspecified


(2) Diabetes:  


Qualified Codes:  E11.65 - Type 2 diabetes mellitus with hyperglycemia; Z79.4 - 

Long term (current) use of insulin








Gus Wu MD R2 Jun 26, 2018 19:53


Jose Cruz Dick MD Jun 27, 2018 17:10

## 2018-06-26 NOTE — RADRPT
EXAM DATE:  6/26/2018 4:17 PM EDT

AGE/SEX:        53 years / Female



INDICATIONS:  Anterior chest pain, coughing, short of breath. Chest pain on and off for years but mor
e severe at this time



CLINICAL DATA:  This is the patient's initial encounter. Patient reports that signs and symptoms have
 been present for 2 weeks and indicates a pain score of 9/10. 

                                                                          

MEDICAL/SURGICAL HISTORY:       Chronic obstructive pulmonary disease.  Diabetes mellitus type II. No
ne.



[place]      [facility] [place]  . 





FINDINGS:  

Portable AP view of the chest demonstrates a normal-sized cardiac silhouette. No effusion, consolidat
ion, or pneumothorax is identified. The bones and soft tissues demonstrate no acute finding.  



CONCLUSION: 

No acute cardiopulmonary abnormality is identified.



Electronically signed by: Herrera Francis MD  6/26/2018 4:37 PM EDT

## 2018-06-26 NOTE — RADRPT
EXAM DATE:  6/26/2018 5:58 PM EDT

AGE/SEX:        53 years / Female



INDICATIONS:  Chest pain.



CLINICAL DATA:  This is the patient's initial encounter. Patient reports that signs and symptoms have
 been present for 1 day and indicates a pain score of 4/10. 

                                                                          

MEDICAL/SURGICAL HISTORY:   Hypertension.  Chronic obstructive pulmonary disease.  Hepatitis C. Hyste
rectomy.



RADIATION DOSE:  14.10 CTDI (mGy)









COMPARISON:      No prior exams available for comparison. 





TECHNIQUE:  Volumetric scanning was performed using a multi-row detector CT scanner during bolus infu
gisela of 75 ml Omnipaque 350 (iohexol)  nonionic water-soluble contrast as a single exam dose. The vaishali
a was post processed with a variety of visualization algorithms including full volume maximum intensi
ty projection and sliding thin slab reformation. Using automated exposure control and adjustment of t
he mA and/or kV according to patient size, radiation dose was kept as low as reasonably achievable to
 obtain optimal diagnostic quality images.  DICOM format image data is available electronically for r
eview and comparison.  



FINDINGS:  

Pulmonary Arteries:  No filling defects are seen in the pulmonary arteries out to the subsegmental ve
ssels.  The left and right pulmonary arteries are normal in diameter.

Lung:  There is vague density in the posterior lower lobes bilaterally likely related to minimal atel
ectasis.

Effusion:  None.

Mediastinum:  No evidence of mediastinal or hilar adenopathy.

Other:  The axilla is unremarkable.



CONCLUSION:

No pulmonary embolus.



Electronically signed by: Herrera Gutierrez MD  6/26/2018 11:54 PM EDT

## 2018-06-26 NOTE — PD
Physical Exam


Date Seen by Provider:  Jun 26, 2018





Data


Data


Last Documented VS





Vital Signs








  Date Time  Temp Pulse Resp B/P (MAP) Pulse Ox O2 Delivery O2 Flow Rate FiO2


 


6/26/18 16:58     97 Room Air  


 


6/26/18 16:58  116      


 


6/26/18 16:58   16 158/90 (112)    


 


6/26/18 15:45 98.4       








Orders





 Orders


Electrocardiogram (6/26/18 15:48)


Complete Blood Count With Diff (6/26/18 15:48)


Basic Metabolic Panel (Bmp) (6/26/18 15:48)


Ckmb (Isoenzyme) Profile (6/26/18 15:48)


Troponin I (6/26/18 15:48)


Iv Access Insert/Monitor (6/26/18 15:48)


Ecg Monitoring (6/26/18 15:48)


Oxygen Administration (6/26/18 15:48)


Oximetry (6/26/18 15:48)


Chest, Single Ap (6/26/18 15:48)


Coag Profile (6/26/18 16:52)


Lipase (6/26/18 16:52)


Ct Pulmonary Angiogram (6/26/18 )


Aspirin (Aspirin) (6/26/18 17:15)





Labs





Laboratory Tests








Test


  6/26/18


16:00 6/26/18


17:05


 


White Blood Count 14.2 TH/MM3  


 


Red Blood Count 5.35 MIL/MM3  


 


Hemoglobin 15.8 GM/DL  


 


Hematocrit 47.5 %  


 


Mean Corpuscular Volume 88.7 FL  


 


Mean Corpuscular Hemoglobin 29.6 PG  


 


Mean Corpuscular Hemoglobin


Concent 33.4 % 


  


 


 


Red Cell Distribution Width 13.1 %  


 


Platelet Count 265 TH/MM3  


 


Mean Platelet Volume 9.4 FL  


 


Neutrophils (%) (Auto) 61.8 %  


 


Lymphocytes (%) (Auto) 32.7 %  


 


Monocytes (%) (Auto) 3.6 %  


 


Eosinophils (%) (Auto) 1.2 %  


 


Basophils (%) (Auto) 0.7 %  


 


Neutrophils # (Auto) 8.8 TH/MM3  


 


Lymphocytes # (Auto) 4.6 TH/MM3  


 


Monocytes # (Auto) 0.5 TH/MM3  


 


Eosinophils # (Auto) 0.2 TH/MM3  


 


Basophils # (Auto) 0.1 TH/MM3  


 


CBC Comment DIFF FINAL  


 


Differential Comment   


 


Blood Urea Nitrogen 10 MG/DL  


 


Creatinine 0.75 MG/DL  


 


Random Glucose 270 MG/DL  


 


Calcium Level 9.5 MG/DL  


 


Sodium Level 136 MEQ/L  


 


Potassium Level 3.8 MEQ/L  


 


Chloride Level 104 MEQ/L  


 


Carbon Dioxide Level 20.9 MEQ/L  


 


Anion Gap 11 MEQ/L  


 


Estimat Glomerular Filtration


Rate 81 ML/MIN 


  


 


 


Total Creatine Kinase 75 U/L  


 


Troponin I 0.02 NG/ML  











MDM


Medical Record Reviewed:  Yes


Supervised Visit with CANDIE:  Yes


Narrative Course


I, Dr. Buckley, have reviewed the advance practice practitioner's documentation 

and am in agreement, met with the patient face to face, made the diagnosis, and 

the medical decision making was done by me.  





*My assessment and Findings: 





Patient is a 53-year-old female presents the emergency room for evaluation of 

chest pain.  Patient reports that she has history of hypertension, 

hyperlipidemia as well as diabetes, reports that she went to her primary care's 

office for well checkup and she was found to have an abnormal EKG as well as 

tachycardia.  Patient reports that she has been having intermittent substernal 

chest pain for a long time now.  Patient reports that when she is onset of 

symptoms, symptoms feeling a "pressure to my chest."  Patient reports that 

nothing makes pain better or worse, reports that symptoms usually last about 5 

minutes at a time and resolves on its own.  Patient denies any diaphoresis with 

her symptoms, she does endorse that she does feel short of breath with her 

chest pain.  Patient did try taking nitroglycerin to help with her symptoms, 

reports that she has run out of this medication.  Patient was sent to the 

emergency room by her primary care doctor for a cardiac workup, she does not 

see a cardiologist and has not had a stress test or echo of her heart yet.  

Patient currently chest pain-free at this time.








Vital Signs








  Date Time  Temp Pulse Resp B/P (MAP) Pulse Ox O2 Delivery O2 Flow Rate FiO2


 


6/26/18 16:58     97 Room Air  


 


6/26/18 16:58  116   97 Room Air  


 


6/26/18 16:58  110 16 158/90 (112) 97 Room Air  


 


6/26/18 16:58  105 18 158/90 (112) 97 Room Air  


 


6/26/18 15:45 98.4 122 17 154/98 (116) 94   








Patient was found to be tachycardic on exam, patient's EKG does show sinus 

tachycardia at 112 bpm, QT/QTc 331/398, other than her tachycardia, patient 

with no acute ST or T-wave changes.  Discussed with patient that I am concerned 

about a possible pulmonary embolism, patient is agreeable to a CTA to rule out 

PE.  Ultimately, patient will need to be admitted to the hospital for cardiac 

workup.











Anna Buckley DO Jun 26, 2018 17:39

## 2018-06-27 VITALS
SYSTOLIC BLOOD PRESSURE: 123 MMHG | DIASTOLIC BLOOD PRESSURE: 78 MMHG | OXYGEN SATURATION: 95 % | RESPIRATION RATE: 16 BRPM | TEMPERATURE: 98.5 F | HEART RATE: 104 BPM

## 2018-06-27 VITALS
OXYGEN SATURATION: 99 % | TEMPERATURE: 98.1 F | DIASTOLIC BLOOD PRESSURE: 73 MMHG | RESPIRATION RATE: 16 BRPM | SYSTOLIC BLOOD PRESSURE: 117 MMHG | HEART RATE: 82 BPM

## 2018-06-27 VITALS
RESPIRATION RATE: 16 BRPM | DIASTOLIC BLOOD PRESSURE: 77 MMHG | TEMPERATURE: 98.7 F | OXYGEN SATURATION: 95 % | HEART RATE: 109 BPM | SYSTOLIC BLOOD PRESSURE: 129 MMHG

## 2018-06-27 VITALS
HEART RATE: 93 BPM | DIASTOLIC BLOOD PRESSURE: 72 MMHG | RESPIRATION RATE: 18 BRPM | SYSTOLIC BLOOD PRESSURE: 112 MMHG | OXYGEN SATURATION: 95 %

## 2018-06-27 VITALS — HEART RATE: 96 BPM

## 2018-06-27 VITALS — HEART RATE: 81 BPM

## 2018-06-27 LAB
BASOPHILS # BLD AUTO: 0.1 TH/MM3 (ref 0–0.2)
BASOPHILS NFR BLD: 0.8 % (ref 0–2)
EOSINOPHIL # BLD: 0.3 TH/MM3 (ref 0–0.4)
EOSINOPHIL NFR BLD: 3.5 % (ref 0–4)
ERYTHROCYTE [DISTWIDTH] IN BLOOD BY AUTOMATED COUNT: 13.1 % (ref 11.6–17.2)
HBA1C MFR BLD: 13.4 % (ref 4.3–6)
HCT VFR BLD CALC: 42.6 % (ref 35–46)
HGB BLD-MCNC: 14.4 GM/DL (ref 11.6–15.3)
LYMPHOCYTES # BLD AUTO: 4.8 TH/MM3 (ref 1–4.8)
LYMPHOCYTES NFR BLD AUTO: 48.5 % (ref 9–44)
MCH RBC QN AUTO: 30.1 PG (ref 27–34)
MCHC RBC AUTO-ENTMCNC: 33.9 % (ref 32–36)
MCV RBC AUTO: 88.7 FL (ref 80–100)
MONOCYTE #: 0.5 TH/MM3 (ref 0–0.9)
MONOCYTES NFR BLD: 5.4 % (ref 0–8)
NEUTROPHILS # BLD AUTO: 4.1 TH/MM3 (ref 1.8–7.7)
NEUTROPHILS NFR BLD AUTO: 41.8 % (ref 16–70)
PLATELET # BLD: 233 TH/MM3 (ref 150–450)
PMV BLD AUTO: 9.3 FL (ref 7–11)
RBC # BLD AUTO: 4.81 MIL/MM3 (ref 4–5.3)
TROPONIN I SERPL-MCNC: 0.02 NG/ML (ref 0.02–0.05)
WBC # BLD AUTO: 9.8 TH/MM3 (ref 4–11)

## 2018-06-27 RX ADMIN — BUDESONIDE AND FORMOTEROL FUMARATE DIHYDRATE SCH PUFF: 160; 4.5 AEROSOL RESPIRATORY (INHALATION) at 13:54

## 2018-06-27 RX ADMIN — ACYCLOVIR SCH UNITS: 800 TABLET ORAL at 09:00

## 2018-06-27 RX ADMIN — INSULIN ASPART SCH: 100 INJECTION, SOLUTION INTRAVENOUS; SUBCUTANEOUS at 12:00

## 2018-06-27 RX ADMIN — OXYBUTYNIN CHLORIDE SCH MG: 5 TABLET ORAL at 13:53

## 2018-06-27 RX ADMIN — OXYBUTYNIN CHLORIDE SCH MG: 5 TABLET ORAL at 06:03

## 2018-06-27 RX ADMIN — INSULIN ASPART SCH: 100 INJECTION, SOLUTION INTRAVENOUS; SUBCUTANEOUS at 08:00

## 2018-06-27 RX ADMIN — Medication SCH ML: at 13:47

## 2018-06-27 RX ADMIN — NICOTINE SCH PATCH: 21 PATCH, EXTENDED RELEASE TOPICAL at 13:46

## 2018-06-27 NOTE — EKG
Date Performed: 06/27/2018       Time Performed: 00:47:52

 

PTAGE:      53 years

 

EKG:      SINUS TACHYCARDIA POSSIBLE LEFT ATRIAL ENLARGEMENT LEFT AXIS DEVIATION ABNORMAL ECG

 

PREVIOUS TRACING       : 06/26/2018 20.49 Since the previous tracing, no significant change noted

 

DOCTOR:   Mario Dial  Interpretating Date/Time  06/27/2018 20:32:23

## 2018-06-27 NOTE — EKG
Date Performed: 06/26/2018       Time Performed: 15:52:51

 

PTAGE:      53 years

 

EKG:      SINUS TACHYCARDIA WITH SHORT SC INTERVAL POSSIBLE LEFT ATRIAL ENLARGEMENT ABNORMAL RHYTHM E
CG Since the 

 

 PREVIOUS TRACING            , no significant change noted

 

DOCTOR:   Mario Dial  Interpretating Date/Time  06/27/2018 21:24:49

## 2018-06-27 NOTE — HHI.FPPN
Subjective


Remarks


Vitals stable overnight.  Patient states that she continues to cough, that his 

cough is worsened and that is making her chest hurt.


No acute events overnight.


 (Zaina Arora MD R1)





Objective


Vitals





Vital Signs








  Date Time  Temp Pulse Resp B/P (MAP) Pulse Ox O2 Delivery O2 Flow Rate FiO2


 


6/27/18 07:09 98.1 82 16 117/73 (88) 99   


 


6/27/18 04:19 98.7 109 16 129/77 (94) 95   


 


6/27/18 04:18  96      


 


6/27/18 00:53 98.5 104 16 123/78 (93) 95   


 


6/26/18 23:37  91      


 


6/26/18 23:19        


 


6/26/18 21:47 98.4 95 16 140/81 (100) 98   


 


6/26/18 18:01  101 18 139/78 (98) 96 Room Air  


 


6/26/18 16:58     97 Room Air  


 


6/26/18 16:58  116   97 Room Air  


 


6/26/18 16:58  110 16 158/90 (112) 97 Room Air  


 


6/26/18 16:58  105 18 158/90 (112) 97 Room Air  


 


6/26/18 15:45 98.4 122 17 154/98 (116) 94   














I/O      


 


 6/26/18 6/26/18 6/26/18 6/27/18 6/27/18 6/27/18





 07:00 15:00 23:00 07:00 15:00 23:00


 


Intake Total    480 ml  


 


Balance    480 ml  


 


      


 


Intake Oral    480 ml  


 


# Voids    4  








 (Zaina Arora MD R1)


Result Diagram:  


6/27/18 0552                                                                   

             6/26/18 1600





Objective Remarks


O.  CONSTITUTIONAL/GEN:  normally nourished, in NAD.  


EYES: conjunctiva normal, PERRLA, EOMI.


ENT:    Mouth and  pharynx normal.  


NECK:  thyroid midline, carotids symmetrical.  


LUNGS:  clear A-P, respiratory effort is normal.  


CARDIOVASCULAR: Intermittent expiratory rhonchi in the lower bases bilaterally.

  No crackles.


GI/ABD:  soft without masses, without organomegaly.


NEURO:  No focal deficits.  Gait is normal


SKIN:  color normal, no rashes noted.


MUSC:    Extremities are normal in appearance.  Tenderness to palpation of the 

left upper chest.


PSYCH/MENTAL STATUS: Appears disheveled.  Mood is anxious.  Affect seems 

slightly labile.  Speech is not entirely coherent at times.


 (Zaina Arora MD R1)





A/P


Assessment and Plan


52 yo female with COPD, DM, low back pain presenting with:


Discharge Planning


Discharge today after stress test results.


 (Zaina Arora MD R1)


Attending Attestation


Patient seen and examined.  Case reviewed and discussed with the resident team.

  Agree with plan of care as discussed with me and documented in the resident 

note.


 (Jose Cruz Dick MD)


Problem List:  


(1) Chest pain in adult


ICD Codes:  R07.9 - Chest pain, unspecified


Status:  Acute


Plan:  ACS ruled out


Pain is likely musculoskeletal





Follow-up stress test results





(2) COPD (chronic obstructive pulmonary disease)


ICD Codes:  J44.9 - Chronic obstructive pulmonary disease, unspecified


Plan:  Occasional rhonchi on lung exam


Patient has been coughing frequently





Continue home Advair


DuoNeb scheduled for every 6 hours


Tessalon Pearls ordered


Albuterol inhaler Q2 hours as needed


O2 if needed 





(3) Diabetes


ICD Codes:  E11.9 - Type 2 diabetes mellitus without complications


Status:  Acute


Plan:  Glucose 270. Patient reports home sugars running in the low- to mid-300s.





-Check A1C


-Repeat BMP in the morning


-Accuchek, medium SSI


-On 40 of lantus BID at home; start with 10 levemir BID in house, titrate as 

needed


-Hold home Byetta





(4) Tobacco abuse


ICD Codes:  Z72.0 - Tobacco use


Status:  Acute


Plan:  Give nicotine patch while hospitalized


 (Zaina Arora MD R1)





Problem Qualifiers





(1) COPD (chronic obstructive pulmonary disease):  


Qualified Codes:  J44.9 - Chronic obstructive pulmonary disease, unspecified


(2) Diabetes:  


Qualified Codes:  E11.65 - Type 2 diabetes mellitus with hyperglycemia; Z79.4 - 

Long term (current) use of insulin








Zaina Arora MD R1 Jun 27, 2018 12:06


Jose Cruz Dick MD Jun 27, 2018 17:12

## 2018-06-27 NOTE — HHI.DCPOC
Discharge Care Plan


Diagnosis:  


(1) Chest pain in adult


Goals to Promote Your Health


* To prevent worsening of your condition and complications


* To maintain your health at the optimal level


Directions to Meet Your Goals


*** Take your medications as prescribed


*** Follow your dietary instruction


*** Follow activity as directed








*** Keep your appointments as scheduled


*** Take your immunizations and boosters as scheduled


*** If your symptoms worsen call your PCP, if no PCP go to Urgent Care Center 

or Emergency Room***


*** Smoking is Dangerous to Your Health. Avoid second hand smoke***


***Call the 24-hour hour crisis hotline for domestic abuse at 1-853.525.2448***











Zaina Arora MD R1 Jun 27, 2018 15:02

## 2018-06-27 NOTE — EKG
Date Performed: 06/26/2018       Time Performed: 20:49:32

 

PTAGE:      53 years

 

EKG:      Sinus rhythm 

 

 POSSIBLE LEFT ATRIAL ENLARGEMENT MILD ST CHANGES BORDERLINE ECG

 

PREVIOUS TRACING       : 06/26/2018 15.52 Since the previous tracing, no significant change noted

 

DOCTOR:   Mario Dial  Interpretating Date/Time  06/27/2018 20:48:13

## 2018-06-27 NOTE — RADRPT
EXAM DATE:  6/27/2018 1:20 PM EDT

AGE/SEX:        53 years / Female



INDICATIONS:Angina. . Chest pain with dyspnea.   

 

CLINICAL DATA:  This is the patient's initial encounter. Patient reports that signs and symptoms have
 been present for 2 days and indicates a pain score of 4/10. 

                                                                          

MEDICAL/SURGICAL HISTORY:       Diabetes mellitus type II.  Hypertension.  Chronic obstructive pulmon
leonides disease.  Hiatal hernia.  Tubal ligation.  Hysterectomy.



COMPARISON:      No prior exams available for comparison. 



 

DOSE:  25.4 mCi Tc 99m Myoview at stress

              8.3  mCi Tc99m-Myoview at rest

              0.4 mg Lexiscan



STRESS SYMPTOMS: None.







EJECTION FRACTION:  53 %



TECHNIQUE:  The patient underwent pharmacologic stress with infusion of prescribed dose.  Continuous 
ECG tracing was monitored during stress.  Gated SPECT imaging was performed after stress and conventi
onal SPECT imaging was performed at rest.  The examination was performed on a SPECT /CT scanner, both
 attenuation and non-corrected datasets were reviewed.



FINDINGS:  

Distribution:  The maximum perfused segment at stress is in the anterior lateral wall

Perfusion Study:   The pattern of perfusion at stress is within normal limits.   

Gated Study:  There are intact wall motion and wall thickening without hypokinetic or dyskinetic segm
ents.  The ejection fraction is calculated at 53%.  



RISK CATEGORY:  Low (<1% Annual Motality Rate)



CONCLUSION: 

1.  Negative for stress-induced ischemia



Electronically signed by: David Conklin MD  6/27/2018 3:01 PM EDT